# Patient Record
Sex: FEMALE | Race: WHITE | URBAN - METROPOLITAN AREA
[De-identification: names, ages, dates, MRNs, and addresses within clinical notes are randomized per-mention and may not be internally consistent; named-entity substitution may affect disease eponyms.]

---

## 2017-01-01 ENCOUNTER — MYC MEDICAL ADVICE (OUTPATIENT)
Dept: INTERNAL MEDICINE | Facility: CLINIC | Age: 72
End: 2017-01-01

## 2017-01-01 ENCOUNTER — TRANSFERRED RECORDS (OUTPATIENT)
Dept: HEALTH INFORMATION MANAGEMENT | Facility: CLINIC | Age: 72
End: 2017-01-01

## 2017-01-01 ENCOUNTER — OFFICE VISIT (OUTPATIENT)
Dept: INTERNAL MEDICINE | Facility: CLINIC | Age: 72
End: 2017-01-01
Payer: COMMERCIAL

## 2017-01-01 ENCOUNTER — HEALTH MAINTENANCE LETTER (OUTPATIENT)
Age: 72
End: 2017-01-01

## 2017-01-01 ENCOUNTER — TELEPHONE (OUTPATIENT)
Dept: INTERNAL MEDICINE | Facility: CLINIC | Age: 72
End: 2017-01-01

## 2017-01-01 VITALS
WEIGHT: 246 LBS | BODY MASS INDEX: 37.4 KG/M2 | HEART RATE: 86 BPM | SYSTOLIC BLOOD PRESSURE: 140 MMHG | DIASTOLIC BLOOD PRESSURE: 75 MMHG | OXYGEN SATURATION: 93 %

## 2017-01-01 VITALS
BODY MASS INDEX: 39.22 KG/M2 | WEIGHT: 258.8 LBS | DIASTOLIC BLOOD PRESSURE: 79 MMHG | WEIGHT: 244 LBS | HEIGHT: 68 IN | SYSTOLIC BLOOD PRESSURE: 148 MMHG | HEART RATE: 100 BPM | HEIGHT: 68 IN | DIASTOLIC BLOOD PRESSURE: 87 MMHG | TEMPERATURE: 98 F | TEMPERATURE: 97.6 F | OXYGEN SATURATION: 93 % | OXYGEN SATURATION: 95 % | SYSTOLIC BLOOD PRESSURE: 137 MMHG | BODY MASS INDEX: 36.98 KG/M2 | HEART RATE: 74 BPM | RESPIRATION RATE: 20 BRPM

## 2017-01-01 DIAGNOSIS — L30.9 ECZEMA, UNSPECIFIED TYPE: ICD-10-CM

## 2017-01-01 DIAGNOSIS — I10 BENIGN ESSENTIAL HYPERTENSION: ICD-10-CM

## 2017-01-01 DIAGNOSIS — I63.9 CEREBROVASCULAR ACCIDENT (CVA), UNSPECIFIED MECHANISM (H): Primary | ICD-10-CM

## 2017-01-01 DIAGNOSIS — E78.5 HYPERLIPIDEMIA WITH TARGET LDL LESS THAN 130: Primary | ICD-10-CM

## 2017-01-01 DIAGNOSIS — Z12.11 SCREEN FOR COLON CANCER: ICD-10-CM

## 2017-01-01 DIAGNOSIS — I10 BENIGN ESSENTIAL HYPERTENSION: Primary | ICD-10-CM

## 2017-01-01 DIAGNOSIS — R73.09 OTHER ABNORMAL GLUCOSE: ICD-10-CM

## 2017-01-01 LAB
ANION GAP SERPL CALCULATED.3IONS-SCNC: 7 MMOL/L (ref 3–14)
ANION GAP SERPL CALCULATED.3IONS-SCNC: 9 MMOL/L (ref 3–14)
BUN SERPL-MCNC: 11 MG/DL (ref 7–30)
BUN SERPL-MCNC: 9 MG/DL (ref 7–30)
CALCIUM SERPL-MCNC: 9.3 MG/DL (ref 8.5–10.1)
CALCIUM SERPL-MCNC: 9.7 MG/DL (ref 8.5–10.1)
CHLORIDE SERPL-SCNC: 103 MMOL/L (ref 94–109)
CHLORIDE SERPL-SCNC: 104 MMOL/L (ref 94–109)
CHOLEST SERPL-MCNC: 152 MG/DL
CO2 SERPL-SCNC: 27 MMOL/L (ref 20–32)
CO2 SERPL-SCNC: 28 MMOL/L (ref 20–32)
COPATH REPORT: NORMAL
CREAT SERPL-MCNC: 0.8 MG/DL (ref 0.52–1.04)
CREAT SERPL-MCNC: 0.83 MG/DL (ref 0.52–1.04)
FINAL DIAGNOSIS: NORMAL
GFR SERPL CREATININE-BSD FRML MDRD: 68 ML/MIN/1.7M2
GFR SERPL CREATININE-BSD FRML MDRD: 70 ML/MIN/1.7M2
GLUCOSE SERPL-MCNC: 106 MG/DL (ref 70–99)
GLUCOSE SERPL-MCNC: 112 MG/DL (ref 70–99)
HBA1C MFR BLD: 6 % (ref 4.3–6)
HDLC SERPL-MCNC: 47 MG/DL
HPV HR 12 DNA CVX QL NAA+PROBE: NEGATIVE
HPV16 DNA SPEC QL NAA+PROBE: NEGATIVE
HPV18 DNA SPEC QL NAA+PROBE: NEGATIVE
LDLC SERPL CALC-MCNC: 67 MG/DL
NONHDLC SERPL-MCNC: 105 MG/DL
PAP: NORMAL
POTASSIUM SERPL-SCNC: 4.1 MMOL/L (ref 3.4–5.3)
POTASSIUM SERPL-SCNC: 4.3 MMOL/L (ref 3.4–5.3)
SODIUM SERPL-SCNC: 139 MMOL/L (ref 133–144)
SODIUM SERPL-SCNC: 139 MMOL/L (ref 133–144)
SPECIMEN DESCRIPTION: NORMAL
TRIGL SERPL-MCNC: 191 MG/DL

## 2017-01-01 PROCEDURE — 99213 OFFICE O/P EST LOW 20 MIN: CPT | Performed by: INTERNAL MEDICINE

## 2017-01-01 PROCEDURE — 80048 BASIC METABOLIC PNL TOTAL CA: CPT | Performed by: INTERNAL MEDICINE

## 2017-01-01 PROCEDURE — 36415 COLL VENOUS BLD VENIPUNCTURE: CPT | Performed by: INTERNAL MEDICINE

## 2017-01-01 PROCEDURE — 83036 HEMOGLOBIN GLYCOSYLATED A1C: CPT | Performed by: INTERNAL MEDICINE

## 2017-01-01 PROCEDURE — 99214 OFFICE O/P EST MOD 30 MIN: CPT | Performed by: INTERNAL MEDICINE

## 2017-01-01 PROCEDURE — 80061 LIPID PANEL: CPT | Performed by: INTERNAL MEDICINE

## 2017-01-01 PROCEDURE — G0476 HPV COMBO ASSAY CA SCREEN: HCPCS | Performed by: INTERNAL MEDICINE

## 2017-01-01 PROCEDURE — G0145 SCR C/V CYTO,THINLAYER,RESCR: HCPCS | Performed by: INTERNAL MEDICINE

## 2017-01-01 RX ORDER — TRIAMCINOLONE ACETONIDE 1 MG/G
CREAM TOPICAL 2 TIMES DAILY
Qty: 80 G | Refills: 0 | Status: SHIPPED | OUTPATIENT
Start: 2017-01-01

## 2017-01-01 RX ORDER — LISINOPRIL 20 MG/1
20 TABLET ORAL DAILY
Qty: 90 TABLET | Refills: 1 | Status: SHIPPED | OUTPATIENT
Start: 2017-01-01 | End: 2017-01-01

## 2017-01-01 RX ORDER — ATORVASTATIN CALCIUM 40 MG/1
TABLET, FILM COATED ORAL
Qty: 90 TABLET | Refills: 2 | Status: SHIPPED | OUTPATIENT
Start: 2017-01-01

## 2017-01-01 RX ORDER — LISINOPRIL 10 MG/1
10 TABLET ORAL DAILY
Qty: 90 TABLET | Refills: 1 | Status: SHIPPED | OUTPATIENT
Start: 2017-01-01

## 2017-01-01 RX ORDER — LISINOPRIL 20 MG/1
TABLET ORAL
Qty: 90 TABLET | Refills: 1 | OUTPATIENT
Start: 2017-01-01

## 2017-01-20 NOTE — PROGRESS NOTES
SUBJECTIVE:                                                    Sandy Oreilly is a 71 year old female who presents to clinic today for the following health issues:    PMH of CVA, parotid gland cancer s/p surgery, and XRT, and a resultant chronic left facial droop, hypertension, JESSE on CPAP, HLD,  S/p left carotid stenting in 4/2016,     Hypertension Follow-up      Outpatient blood pressures are being checked at home.  Results are <140/90. Maybe 140 once a week    Low Salt Diet: low salt       Amount of exercise or physical activity: 6-7 days/week for an average of 15-30 minutes    Problems taking medications regularly: No    Medication side effects: none    Diet: low salt    Patient denies chest pain, chest pressure, shortness of breath, palpitations, headaches, visual changes, or any noted lower extremity swelling.     HLD:  History of CVA per patient d/t XRT to the neck region.  On Atorvastatin 40mg daily and now on aspirin 81mg daily.         Problem list and histories reviewed & adjusted, as indicated.  Additional history: as documented    Patient Active Problem List   Diagnosis     CARDIOVASCULAR SCREENING; LDL GOAL LESS THAN 160     Health Care Home     Advanced directives, counseling/discussion     Elevated blood pressure     Hyperlipidemia with target LDL less than 130     Benign neoplasm of colon (ADENOMATOUS)     Eczema     Cerebrovascular accident (CVA), unspecified     JESSE (obstructive sleep apnea)     Prediabetes     Benign essential hypertension     Past Surgical History   Procedure Laterality Date     Cholecystectomy  2009     Parotidectomy  1996 & 1997     thyroid CA     Tonsillectomy         Social History   Substance Use Topics     Smoking status: Never Smoker      Smokeless tobacco: Never Used     Alcohol Use: Yes     Family History   Problem Relation Age of Onset     HEART DISEASE Brother      CABAG     HEART DISEASE Brother      CABAG     CANCER Brother      Esophegeal CA     HEART DISEASE  "Brother          Current Outpatient Prescriptions   Medication Sig Dispense Refill     Multiple Vitamins-Minerals (ONE-A-DAY WOMENS 50 PLUS PO) Take by mouth daily       lisinopril (PRINIVIL/ZESTRIL) 20 MG tablet Take 1 tablet (20 mg) by mouth daily 90 tablet 1     atorvastatin (LIPITOR) 40 MG tablet Take 1 tablet (40 mg) by mouth daily 90 tablet 3     aspirin 81 MG tablet Take 81 mg by mouth daily       senna-docusate (SENOKOT-S;PERICOLACE) 8.6-50 MG per tablet Take 1 tablet by mouth daily       triamcinolone (KENALOG) 0.1 % cream Apply topically 2 times daily       ==============================================================  ROS:  Constitutional, HEENT, cardiovascular, pulmonary, GI, , musculoskeletal, neuro, skin, endocrine and psych systems are negative, except as otherwise noted.       OBJECTIVE:                                                    /87 mmHg  Pulse 100  Temp(Src) 97.6  F (36.4  C) (Oral)  Resp 20  Ht 5' 8\" (1.727 m)  Wt 258 lb 12.8 oz (117.391 kg)  BMI 39.36 kg/m2  SpO2 93%  Body mass index is 39.36 kg/(m^2).     GENERAL APPEARANCE: healthy, alert and in no distress  EYES: Eyes grossly normal to inspection, and conjunctivae and sclerae normal  HENT: head normocephalic and atraumatic and mouth without ulcers or lesions, oropharynx clear and oral mucous membranes moist  NECK: no noticeable adenopathy, no asymmetry, masses, or scars   RESP: lungs clear to auscultation - no rales, rhonchi or wheezes  CV: regular rate and rhythm, normal S1 S2, no S3 or S4, no murmur, click or rub, no peripheral edema and peripheral pulses strong  ABDOMEN: soft, nontender, no hepatosplenomegaly, no masses and bowel sounds normal  MS: no musculoskeletal defects are noted and gait is age appropriate without ataxia  SKIN: no suspicious lesions or rashes  NEURO: mentation intact and speech normal  PSYCH: mentation appears normal and affect normal/bright.    Results for orders placed or performed in visit " on 01/20/17   Basic metabolic panel   Result Value Ref Range    Sodium 139 133 - 144 mmol/L    Potassium 4.3 3.4 - 5.3 mmol/L    Chloride 103 94 - 109 mmol/L    Carbon Dioxide 27 20 - 32 mmol/L    Anion Gap 9 3 - 14 mmol/L    Glucose 112 (H) 70 - 99 mg/dL    Urea Nitrogen 11 7 - 30 mg/dL    Creatinine 0.80 0.52 - 1.04 mg/dL    GFR Estimate 70 >60 mL/min/1.7m2    GFR Estimate If Black 85 >60 mL/min/1.7m2    Calcium 9.3 8.5 - 10.1 mg/dL   Lipid Profile with reflex to direct LDL   Result Value Ref Range    Cholesterol 152 <200 mg/dL    Triglycerides 191 (H) <150 mg/dL    HDL Cholesterol 47 (L) >49 mg/dL    LDL Cholesterol Calculated 67 <100 mg/dL    Non HDL Cholesterol 105 <130 mg/dL   Hemoglobin A1c   Result Value Ref Range    Hemoglobin A1C 6.0 4.3 - 6.0 %      The ASCVD Risk score (Coleen SEPULVEDA Jr., et al., 2013) failed to calculate for the following reasons:    The patient has a prior MCI or stroke diagnosis      ASSESSMENT/PLAN:                                                        ICD-10-CM    1. Cerebrovascular accident (CVA), unspecified mechanism (H) I63.9 Lipid Profile with reflex to direct LDL   2. Benign essential hypertension I10 Basic metabolic panel     lisinopril (PRINIVIL/ZESTRIL) 20 MG tablet   3. Other abnormal glucose R73.09 Hemoglobin A1c     (I63.9) Cerebrovascular accident (CVA), unspecified mechanism (H)  (primary encounter diagnosis)    LDL CHOLESTEROL CALCULATED   Date Value Ref Range Status   01/20/2017 67 <100 mg/dL Final     Comment:     Desirable:       <100 mg/dl   ]    Comment: LDL is low currently  Plan:   Continue current medications, including the current statin  Lipid Profile with reflex to direct LDL            (I10) Benign essential hypertension  Comment: controlled asympt  Plan: Basic metabolic panel, lisinopril         (PRINIVIL/ZESTRIL) 20 MG tablet  Continue current regimen.     (R73.09) Other abnormal glucose  Comment: prediabetes range;  Plan: Hemoglobin A1c        As per orders  above and patient instructions below.  May consider metformin in the future     Patient Instructions   We will let you know your test results as discussed: by phone for urgent results, otherwise by postal mail.  Please return to clinic in 6 months (we may advise a visit sooner, based on today's lab/test results).                 Zayra Marques MD  [facility]

## 2017-01-20 NOTE — PATIENT INSTRUCTIONS
We will let you know your test results as discussed: by phone for urgent results, otherwise by postal mail.  Please return to clinic in 6 months (we may advise a visit sooner, based on today's lab/test results).

## 2017-01-20 NOTE — Clinical Note
"[facility]  [address] [facility] [address] [phone]        January 26, 2017    Sandy Oreilly  [address] [facility] [address]            Dear Sandy,     Your kidney function and blood electrolytes are within normal limits.   Your cholesterol numbers have improved significantly.  Please continue your current Lipitor (Atorvastatin).  Your hemoglobin A1C, which reflects your average blood sugars over the past 3 months, is within its previous \"Prediabetes\" range.    Otherwise, please continue the treatment plan that we discussed at your last clinic visit and let me know if you have any questions via [facility] or by calling [phone].      Zayra Marques MD/ms    Results for orders placed or performed in visit on 01/20/17   Basic metabolic panel   Result Value Ref Range    Sodium 139 133 - 144 mmol/L    Potassium 4.3 3.4 - 5.3 mmol/L    Chloride 103 94 - 109 mmol/L    Carbon Dioxide 27 20 - 32 mmol/L    Anion Gap 9 3 - 14 mmol/L    Glucose 112 (H) 70 - 99 mg/dL    Urea Nitrogen 11 7 - 30 mg/dL    Creatinine 0.80 0.52 - 1.04 mg/dL    GFR Estimate 70 >60 mL/min/1.7m2    GFR Estimate If Black 85 >60 mL/min/1.7m2    Calcium 9.3 8.5 - 10.1 mg/dL   Lipid Profile with reflex to direct LDL   Result Value Ref Range    Cholesterol 152 <200 mg/dL    Triglycerides 191 (H) <150 mg/dL    HDL Cholesterol 47 (L) >49 mg/dL    LDL Cholesterol Calculated 67 <100 mg/dL    Non HDL Cholesterol 105 <130 mg/dL   Hemoglobin A1c   Result Value Ref Range    Hemoglobin A1C 6.0 4.3 - 6.0 %             "

## 2017-01-20 NOTE — NURSING NOTE
"Chief Complaint   Patient presents with     Hypertension       Initial /87 mmHg  Pulse 100  Temp(Src) 97.6  F (36.4  C) (Oral)  Resp 20  Ht 5' 8\" (1.727 m)  Wt 258 lb 12.8 oz (117.391 kg)  BMI 39.36 kg/m2  SpO2 93% Estimated body mass index is 39.36 kg/(m^2) as calculated from the following:    Height as of this encounter: 5' 8\" (1.727 m).    Weight as of this encounter: 258 lb 12.8 oz (117.391 kg).  BP completed using cuff size: sydnie Faria CMA    "

## 2017-01-20 NOTE — MR AVS SNAPSHOT
"              After Visit Summary   1/20/2017    Sandy Oreilly    MRN: [id number]           Patient Information     Date Of Birth          1945        Visit Information        Provider Department      1/20/2017 7:30 AM Zayra Marques MD [facility]        Today's Diagnoses     Cerebrovascular accident (CVA), unspecified mechanism (H)    -  1     Benign essential hypertension         Other abnormal glucose           Care Instructions    We will let you know your test results as discussed: by phone for urgent results, otherwise by postal mail.  Please return to clinic in 6 months (we may advise a visit sooner, based on today's lab/test results).        Follow-ups after your visit        Who to contact     If you have questions or need follow up information about today's clinic visit or your schedule please contact [facility] directly at [phone].  Normal or non-critical lab and imaging results will be communicated to you by MyChart, letter or phone within 4 business days after the clinic has received the results. If you do not hear from us within 7 days, please contact the clinic through MyChart or phone. If you have a critical or abnormal lab result, we will notify you by phone as soon as possible.  Submit refill requests through [email or web address] or call your pharmacy and they will forward the refill request to us. Please allow 3 business days for your refill to be completed.          Additional Information About Your Visit        MyChart Information     [email or web address] lets you send messages to your doctor, view your test results, renew your prescriptions, schedule appointments and more. To sign up, go to [email or web address] . Click on \"Log in\" on the left side of the screen, which will take you to the Welcome page. Then click on \"Sign up Now\" on the right side of the page.     You will be asked to enter the access code listed below, as well as some personal information. " "Please follow the directions to create your username and password.     Your access code is: [email or web address]  Expires: 2017  8:09 AM     Your access code will  in 90 days. If you need help or a new code, please call your [place] [facility] clinic or [phone].        Care EveryWhere ID     This is your Care EveryWhere ID. This could be used by other organizations to access your [facility] medical records  [id number]        Your Vitals Were     Pulse Temperature Respirations Height BMI (Body Mass Index) Pulse Oximetry    100 97.6  F (36.4  C) (Oral) 20 5' 8\" (1.727 m) 39.36 kg/m2 93%       Blood Pressure from Last 3 Encounters:   17 148/87   16 124/70   16 105/53    Weight from Last 3 Encounters:   17 258 lb 12.8 oz (117.391 kg)   16 259 lb (117.482 kg)   04/15/16 263 lb (119.296 kg)              We Performed the Following     Basic metabolic panel     Hemoglobin A1c     Lipid Profile with reflex to direct LDL          Where to get your medicines      These medications were sent to [place] [address] [place], [place] -  [facility]   [facility] [address]     Phone:  [phone]    - lisinopril 20 MG tablet       Primary Care Provider Office Phone # Fax #    Zayra Marques [phone]       [facility] [address] [facility] [address]        Thank you!     Thank you for choosing [facility]  for your care. Our goal is always to provide you with excellent care. Hearing back from our patients is one way we can continue to improve our services. Please take a few minutes to complete the written survey that you may receive in the mail after your visit with us. Thank you!             Your Updated Medication List - Protect others around you: Learn how to safely use, store and throw away your medicines at www.disposemymeds.org.          This list is accurate as of: 17  8:09 AM.  Always use your most " recent med list.                   Brand Name Dispense Instructions for use    aspirin 81 MG tablet      Take 81 mg by mouth daily       atorvastatin 40 MG tablet    LIPITOR    90 tablet    Take 1 tablet (40 mg) by mouth daily       lisinopril 20 MG tablet    PRINIVIL/ZESTRIL    90 tablet    Take 1 tablet (20 mg) by mouth daily       ONE-A-DAY WOMENS 50 PLUS PO      Take by mouth daily       senna-docusate 8.6-50 MG per tablet    SENOKOT-S;PERICOLACE     Take 1 tablet by mouth daily       triamcinolone 0.1 % cream    KENALOG     Apply topically 2 times daily

## 2017-01-26 NOTE — PROGRESS NOTES
"Quick Note:    Please send letter informing the patient and attach results:    Dear Sandy,     Your kidney function and blood electrolytes are within normal limits.   Your cholesterol numbers have improved significantly. Please continue your current Lipitor (Atorvastatin).  Your hemoglobin A1C, which reflects your average blood sugars over the past 3 months, is within its previous \"Prediabetes\" range.    Otherwise, please continue the treatment plan that we discussed at your last clinic visit and let me know if you have any questions via [employer] or by calling [phone].      Zayra Marques MD  ______  "

## 2017-07-20 NOTE — PROGRESS NOTES
"  SUBJECTIVE:                                                    Sandy Oreilly is a 71 year old female who presents to clinic today for the following health issues:    PMH of CVA, parotid gland cancer s/p surgery, and XRT, and a resultant chronic left facial droop, hypertension, JESSE on CPAP, HLD,  S/p left carotid stenting in 4/2016, hypertension,    Hypertension Follow-up      Outpatient blood pressures are being checked at home.  Results are 124-133/62-70.    Low Salt Diet: low salt        Amount of exercise or physical activity: None    Problems taking medications regularly: No    Medication side effects: none  Diet: low salt, low fat/cholesterol and Nutri-System    Recent vascular surgery note reviewed.   Patient is on lisinopril.   Patient denies chest pain, chest pressure, shortness of breath, palpitations, headaches, visual changes, or any noted lower extremity swelling.     Cyst:  On the patient's neck, right posterior aspect-patient was seen by an outside ENT (ie, [facility] and was advised that it needs to be excised).  Patient is working with them to get this procedure scheduled.         Problem list and histories reviewed & adjusted, as indicated.  Additional history: as documented    Patient Active Problem List   Diagnosis     CARDIOVASCULAR SCREENING; LDL GOAL LESS THAN 160     Health Care Home     Advanced directives, counseling/discussion     Elevated blood pressure     Hyperlipidemia with target LDL less than 130     Benign neoplasm of colon (ADENOMATOUS)     Eczema     Cerebrovascular accident (CVA), unspecified     JESSE (obstructive sleep apnea)     Prediabetes     Benign essential hypertension     HTN, goal below 140/90     Past Surgical History:   Procedure Laterality Date     CHOLECYSTECTOMY  2009     PAROTIDECTOMY  1996 & 1997    thyroid CA     TONSILLECTOMY         Social History   Substance Use Topics     Smoking status: Never Smoker     Smokeless tobacco: Never Used     Alcohol " "use Yes     Family History   Problem Relation Age of Onset     HEART DISEASE Brother      DARCYAG     HEART DISEASE Brother      BLACK     CANCER Brother      Esophegeal CA     HEART DISEASE Brother          Current Outpatient Prescriptions   Medication Sig Dispense Refill     triamcinolone (KENALOG) 0.1 % cream Apply topically 2 times daily 80 g 0     lisinopril (PRINIVIL/ZESTRIL) 20 MG tablet Take 1 tablet (20 mg) by mouth daily 90 tablet 1     atorvastatin (LIPITOR) 40 MG tablet TAKE ONE TABLET BY MOUTH ONE TIME DAILY 90 tablet 2     Multiple Vitamins-Minerals (ONE-A-DAY WOMENS 50 PLUS PO) Take by mouth daily       aspirin 81 MG tablet Take 81 mg by mouth daily       senna-docusate (SENOKOT-S;PERICOLACE) 8.6-50 MG per tablet Take 1 tablet by mouth daily           Reviewed and updated as needed this visit by clinical staff     Reviewed and updated as needed this visit by Provider           ==============================================================  ROS:  Constitutional, HEENT, cardiovascular, pulmonary, GI, , musculoskeletal, neuro, skin, endocrine and psych systems are negative, except as otherwise noted.         OBJECTIVE:                                                    /79  Pulse 74  Temp 98  F (36.7  C) (Oral)  Ht 5' 8\" (1.727 m)  Wt 244 lb (110.7 kg)  SpO2 95%  BMI 37.1 kg/m2  Body mass index is 37.1 kg/(m^2).     GENERAL APPEARANCE: healthy, alert and in no distress  EYES: Eyes grossly normal to inspection, and conjunctivae and sclerae normal  HENT:   Chronic left facial droop.  O/w, head normocephalic and atraumatic and mouth without ulcers or lesions, oropharynx clear and oral mucous membranes moist  NECK:   There is a 1.5cm mobile, non-tender, flesh-colored nodule on the lateral-posterior aspect of the right side of the neck.  Post-surgical changes of the left lower face and left side of the neck.   O/w, no noticeable adenopathy, no asymmetry, masses, or scars   RESP: lungs clear to " auscultation - no rales, rhonchi or wheezes  CV: regular rate and rhythm, normal S1 S2, no S3 or S4, no murmur, click or rub, no peripheral edema and peripheral pulses strong  ABDOMEN: soft, nontender, no hepatosplenomegaly, no masses and bowel sounds normal  MS: no musculoskeletal defects are noted and gait is age appropriate without ataxia  SKIN: no suspicious lesions or rashes  NEURO:   Chronic left facial droop.  mentation intact and speech normal  PSYCH: mentation appears normal and affect normal/bright.     Results for orders placed or performed in visit on 07/21/17   Basic metabolic panel   Result Value Ref Range    Sodium 139 133 - 144 mmol/L    Potassium 4.1 3.4 - 5.3 mmol/L    Chloride 104 94 - 109 mmol/L    Carbon Dioxide 28 20 - 32 mmol/L    Anion Gap 7 3 - 14 mmol/L    Glucose 106 (H) 70 - 99 mg/dL    Urea Nitrogen 9 7 - 30 mg/dL    Creatinine 0.83 0.52 - 1.04 mg/dL    GFR Estimate 68 >60 mL/min/1.7m2    GFR Estimate If Black 82 >60 mL/min/1.7m2    Calcium 9.7 8.5 - 10.1 mg/dL        ASSESSMENT/PLAN:                                                        ICD-10-CM    1. Benign essential hypertension I10 lisinopril (PRINIVIL/ZESTRIL) 20 MG tablet     Basic metabolic panel   2. Eczema, unspecified type L30.9 triamcinolone (KENALOG) 0.1 % cream    on right shoulder and labia, recurrent    3. Screen for colon cancer Z12.11 GASTROENTEROLOGY ADULT REF PROCEDURE ONLY       (I10) Benign essential hypertension  (primary encounter diagnosis)  Comment: at goal, asymptomatic; BMP stable.  Plan: lisinopril (PRINIVIL/ZESTRIL) 20 MG tablet,         Basic metabolic panel        As per orders above and patient instructions below.     (L30.9) Eczema, unspecified type  Comment: on right shoulder and labia, recurrent   Plan: refill triamcinolone (KENALOG) 0.1 % cream            (Z12.11) Screen for colon cancer  Comment:   Plan: GASTROENTEROLOGY ADULT REF PROCEDURE ONLY       As per orders above and patient instructions  below.        Patient Instructions   You are due for your colonoscopy now. Please call [facility] [phone]-to schedule this appointment.    See your eye doctor once a year.      Please return to clinic in 6 months (we may advise a visit sooner, based on today's lab/test results).                   Zayra Marques MD  [facility]

## 2017-07-21 PROBLEM — I10 HTN, GOAL BELOW 140/90: Status: ACTIVE | Noted: 2017-01-01

## 2017-07-21 NOTE — LETTER
[facility]  [address] [facility] [address] [phone]        July 24, 2017    Sandy Oreilly  [address] [facility] [address]            Dear Sandy,    Your kidney function and blood electrolytes are within normal limits.   Please continue the treatment plan that we discussed at your last clinic visit and let me know if you have any questions via [employer] or by calling [phone].      Zayra Marques MD/santo      Results for orders placed or performed in visit on 07/21/17   Basic metabolic panel   Result Value Ref Range    Sodium 139 133 - 144 mmol/L    Potassium 4.1 3.4 - 5.3 mmol/L    Chloride 104 94 - 109 mmol/L    Carbon Dioxide 28 20 - 32 mmol/L    Anion Gap 7 3 - 14 mmol/L    Glucose 106 (H) 70 - 99 mg/dL    Urea Nitrogen 9 7 - 30 mg/dL    Creatinine 0.83 0.52 - 1.04 mg/dL    GFR Estimate 68 >60 mL/min/1.7m2    GFR Estimate If Black 82 >60 mL/min/1.7m2    Calcium 9.7 8.5 - 10.1 mg/dL

## 2017-07-21 NOTE — MR AVS SNAPSHOT
After Visit Summary   7/21/2017    Sandy Oreilly    MRN: [id number]           Patient Information     Date Of Birth          1945        Visit Information        Provider Department      7/21/2017 7:30 AM Zayra Marques MD [facility]        Today's Diagnoses     Screen for colon cancer    -  1    Benign essential hypertension        Eczema, unspecified type          Care Instructions    You are due for your colonoscopy now. Please call [facility] [phone]-to schedule this appointment.    See your eye doctor once a year.      Please return to clinic in 6 months (we may advise a visit sooner, based on today's lab/test results).          Follow-ups after your visit        Additional Services     GASTROENTEROLOGY ADULT REF PROCEDURE ONLY       Last Lab Result: Creatinine (mg/dL)       Date                     Value                 01/20/2017               0.80             ----------  Body mass index is 37.1 kg/(m^2).     Needed:  No  Language:  English    Patient will be contacted to schedule procedure.     Please be aware that coverage of these services is subject to the terms and limitations of your health insurance plan.  Call member services at your health plan with any benefit or coverage questions.  Any procedures must be performed at a [facility] facility OR coordinated by your clinic's referral office.    Please bring the following with you to your appointment:    (1) Any X-Rays, CTs or MRIs which have been performed.  Contact the facility where they were done to arrange for  prior to your scheduled appointment.    (2) List of current medications   (3) This referral request   (4) Any documents/labs given to you for this referral                  Your next 10 appointments already scheduled     Jan 24, 2018  8:00 AM CST   Office Visit with Zayra Marques MD   [facility]     "[address] [facility] [address] [phone]           Bring a current list of meds and any records pertaining to this visit.  For Physicals, please bring immunization records and any forms needing to be filled out.  Please arrive 10 minutes early to complete paperwork.              Who to contact     If you have questions or need follow up information about today's clinic visit or your schedule please contact [facility] directly at [phone].  Normal or non-critical lab and imaging results will be communicated to you by MyChart, letter or phone within 4 business days after the clinic has received the results. If you do not hear from us within 7 days, please contact the clinic through Protection Plushart or phone. If you have a critical or abnormal lab result, we will notify you by phone as soon as possible.  Submit refill requests through GardenStory or call your pharmacy and they will forward the refill request to us. Please allow 3 business days for your refill to be completed.          Additional Information About Your Visit        [facility] Information     GardenStory lets you send messages to your doctor, view your test results, renew your prescriptions, schedule appointments and more. To sign up, go to [email or web address] . Click on \"Log in\" on the left side of the screen, which will take you to the Welcome page. Then click on \"Sign up Now\" on the right side of the page.     You will be asked to enter the access code listed below, as well as some personal information. Please follow the directions to create your username and password.     Your access code is: [email or web address]  Expires: 10/19/2017  8:33 AM     Your access code will  in 90 days. If you need help or a new code, please call your [facility] or [phone].        Care EveryWhere ID     This is your Care EveryWhere ID. This could be used by other organizations to access your [facility] medical records  [id number]        Your " "Vitals Were     Pulse Temperature Height Pulse Oximetry BMI (Body Mass Index)       74 98  F (36.7  C) (Oral) 5' 8\" (1.727 m) 95% 37.1 kg/m2        Blood Pressure from Last 3 Encounters:   07/21/17 137/79   01/20/17 148/87   07/20/16 124/70    Weight from Last 3 Encounters:   07/21/17 244 lb (110.7 kg)   01/20/17 258 lb 12.8 oz (117.4 kg)   07/20/16 259 lb (117.5 kg)              We Performed the Following     Basic metabolic panel     GASTROENTEROLOGY ADULT REF PROCEDURE ONLY          Where to get your medicines      These medications were sent to [place] [facility] [address] [place], [place] - [address] [facility]  [address] [facility] [address]     Phone:  [phone]     lisinopril 20 MG tablet    triamcinolone 0.1 % cream          Primary Care Provider Office Phone # Fax #    Zayra Marques [phone]       [facility] [address] [facility] [address]        Equal Access to Services     [employer] : Hadii aad ku hadasho Soomaali, waaxda luqadaha, qaybta kaalmada adeegyada, heena haynes hayralph randall . So [facility] [phone].    ATENCIÓN: Si habla español, tiene a kevin disposición servicios gratuitos de asistencia lingüística. [facility] [phone].    We comply with applicable federal civil rights laws and [place] laws. We do not discriminate on the basis of race, color, national origin, age, disability sex, sexual orientation or gender identity.            Thank you!     Thank you for choosing [facility]  for your care. Our goal is always to provide you with excellent care. Hearing back from our patients is one way we can continue to improve our services. Please take a few minutes to complete the written survey that you may receive in the mail after your visit with us. Thank you!             Your Updated Medication List - Protect others around you: Learn how to safely use, store and throw away your medicines at " www.disposemymeds.org.          This list is accurate as of: 7/21/17  8:33 AM.  Always use your most recent med list.                   Brand Name Dispense Instructions for use Diagnosis    aspirin 81 MG tablet      Take 81 mg by mouth daily        atorvastatin 40 MG tablet    LIPITOR    90 tablet    TAKE ONE TABLET BY MOUTH ONE TIME DAILY    Hyperlipidemia with target LDL less than 130       lisinopril 20 MG tablet    PRINIVIL/ZESTRIL    90 tablet    Take 1 tablet (20 mg) by mouth daily    Benign essential hypertension       ONE-A-DAY WOMENS 50 PLUS PO      Take by mouth daily        senna-docusate 8.6-50 MG per tablet    SENOKOT-S;PERICOLACE     Take 1 tablet by mouth daily        triamcinolone 0.1 % cream    KENALOG    80 g    Apply topically 2 times daily    Eczema, unspecified type

## 2017-07-21 NOTE — PATIENT INSTRUCTIONS
You are due for your colonoscopy now. Please call [facility] [phone]-to schedule this appointment.    See your eye doctor once a year.      Please return to clinic in 6 months (we may advise a visit sooner, based on today's lab/test results).

## 2017-07-21 NOTE — NURSING NOTE
"Chief Complaint   Patient presents with     Hypertension       Initial /79  Pulse 74  Temp 98  F (36.7  C) (Oral)  Ht 5' 8\" (1.727 m)  Wt 244 lb (110.7 kg)  SpO2 95%  BMI 37.1 kg/m2 Estimated body mass index is 37.1 kg/(m^2) as calculated from the following:    Height as of this encounter: 5' 8\" (1.727 m).    Weight as of this encounter: 244 lb (110.7 kg).  Medication Reconciliation: complete    Leah Leon MA    "

## 2017-07-24 NOTE — PROGRESS NOTES
Please send letter informing the patient and attach results:    Dear Sandy,    Your kidney function and blood electrolytes are within normal limits.   Please continue the treatment plan that we discussed at your last clinic visit and let me know if you have any questions via POPSUGAR or by calling [phone].      Zayra Marques MD

## 2017-09-13 NOTE — LETTER
September 28, 2017      Sandy Guillenlas  [address] [facility] [address]    Dear ,      I am happy to inform you that your cervical cancer screening test (PAP smear) was normal and your Human Papillomavirus (HPV) test was negative.    Per current guidelines, you no longer need to have pap smears completed.     Please continue to be seen every year for an annual wellness visit and other preventative tests.     Please contact my office at [phone] if you have further questions.    Sincerely,      Zayra Marques MD/darwin

## 2017-09-13 NOTE — PROGRESS NOTES
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  No recent vaginal symptoms, per patient.    Problem list and histories reviewed & adjusted, as indicated.  Additional history: as documented    Patient Active Problem List   Diagnosis     CARDIOVASCULAR SCREENING; LDL GOAL LESS THAN 160     Health Care Home     Advanced directives, counseling/discussion     Elevated blood pressure     Hyperlipidemia with target LDL less than 130     Benign neoplasm of colon (ADENOMATOUS)     Eczema     Cerebrovascular accident (CVA), unspecified     JESSE (obstructive sleep apnea)     Prediabetes     Benign essential hypertension     HTN, goal below 140/90     Past Surgical History:   Procedure Laterality Date     CHOLECYSTECTOMY  2009     PAROTIDECTOMY  1996 & 1997    thyroid CA     TONSILLECTOMY         Social History   Substance Use Topics     Smoking status: Never Smoker     Smokeless tobacco: Never Used     Alcohol use Yes     Family History   Problem Relation Age of Onset     HEART DISEASE Brother      CABAG     HEART DISEASE Brother      CABAG     CANCER Brother      Esophegeal CA     HEART DISEASE Brother          Current Outpatient Prescriptions   Medication Sig Dispense Refill     triamcinolone (KENALOG) 0.1 % cream Apply topically 2 times daily 80 g 0     lisinopril (PRINIVIL/ZESTRIL) 20 MG tablet Take 1 tablet (20 mg) by mouth daily 90 tablet 1     atorvastatin (LIPITOR) 40 MG tablet TAKE ONE TABLET BY MOUTH ONE TIME DAILY 90 tablet 2     Multiple Vitamins-Minerals (ONE-A-DAY WOMENS 50 PLUS PO) Take by mouth daily       aspirin 81 MG tablet Take 81 mg by mouth daily        senna-docusate (SENOKOT-S;PERICOLACE) 8.6-50 MG per tablet Take 1 tablet by mouth daily           Reviewed and updated as needed this visit by clinical staffTobacco  Allergies  Med Hx  Surg Hx  Fam Hx  Soc Hx      Reviewed and updated as needed this visit by Provider         ==============================================================  ROS:  Constitutional, HEENT, cardiovascular, pulmonary, GI, , musculoskeletal, neuro, skin, endocrine and psych systems are negative, except as otherwise noted.         OBJECTIVE:                                                    /75  Pulse 86  Wt 246 lb (111.6 kg)  SpO2 93%  BMI 37.4 kg/m2  Body mass index is 37.4 kg/(m^2).     GENERAL APPEARANCE: healthy, alert and in no distress  EYES: Eyes grossly normal to inspection, and conjunctivae and sclerae normal  RESP: lungs clear to auscultation - no rales, rhonchi or wheezes  CV: regular rate and rhythm, normal S1 S2, no S3 or S4, no murmur, click or rub, no peripheral edema and peripheral pulses strong  ABDOMEN: soft, nontender, no hepatosplenomegaly, no masses and bowel sounds normal  : no abnormal vaginal or cervical lesions or discharge.   MS: no musculoskeletal defects are noted and gait is age appropriate without ataxia  SKIN: no suspicious lesions or rashes  NEURO: mentation intact and speech normal;facial droop as at baseline  PSYCH: mentation appears normal and affect normal/bright.     Results for orders placed or performed in visit on 07/21/17   Basic metabolic panel   Result Value Ref Range    Sodium 139 133 - 144 mmol/L    Potassium 4.1 3.4 - 5.3 mmol/L    Chloride 104 94 - 109 mmol/L    Carbon Dioxide 28 20 - 32 mmol/L    Anion Gap 7 3 - 14 mmol/L    Glucose 106 (H) 70 - 99 mg/dL    Urea Nitrogen 9 7 - 30 mg/dL    Creatinine 0.83 0.52 - 1.04 mg/dL    GFR Estimate 68 >60 mL/min/1.7m2    GFR Estimate If Black 82 >60 mL/min/1.7m2    Calcium 9.7 8.5 - 10.1 mg/dL          ICD-10-CM    1. Squamous cell  carcinoma C44.92 A Pap thin layer screen with HPV - recommended for age 30 -65     HPV High Risk Types DNA Cervical    unknown primary, need a PAP     PLAN:  See tele encounter 9.13.17.    Patient Instructions   We will forward the PAP results to Dr Murray Lewis.                  Zayra Marques MD  [facility]

## 2017-09-13 NOTE — NURSING NOTE
"Chief Complaint   Patient presents with     Consult       Initial /75  Pulse 86  Wt 246 lb (111.6 kg)  SpO2 93%  BMI 37.4 kg/m2 Estimated body mass index is 37.4 kg/(m^2) as calculated from the following:    Height as of 7/21/17: 5' 8\" (1.727 m).    Weight as of this encounter: 246 lb (111.6 kg).  Medication Reconciliation: complete  "

## 2017-09-13 NOTE — MR AVS SNAPSHOT
"              After Visit Summary   9/13/2017    Sandy Oreilly    MRN: [id number]           Patient Information     Date Of Birth          1945        Visit Information        Provider Department      9/13/2017 6:40 PM Zayra Marques MD [facility]        Today's Diagnoses     Squamous cell carcinoma    -  1      Care Instructions    We will forward the PAP results to Dr Murray Lewis.            Follow-ups after your visit        Your next 10 appointments already scheduled     Jan 24, 2018  8:00 AM CST   [facility]    [address] [facility] [address] [phone]           Bring a current list of meds and any records pertaining to this visit. For Physicals, please bring immunization records and any forms needing to be filled out. Please arrive 10 minutes early to complete paperwork.              Who to contact     If you have questions or need follow up information about today's clinic visit or your schedule please contact [facility] directly at [phone].  Normal or non-critical lab and imaging results will be communicated to you by Big Data Partnershiphart, letter or phone within 4 business days after the clinic has received the results. If you do not hear from us within 7 days, please contact the clinic through GPNXt or phone. If you have a critical or abnormal lab result, we will notify you by phone as soon as possible.  Submit refill requests through Saber Seven or call your pharmacy and they will forward the refill request to us. Please allow 3 business days for your refill to be completed.          Additional Information About Your Visit        MyChart Information     Saber Seven lets you send messages to your doctor, view your test results, renew your prescriptions, schedule appointments and more. To sign up, go to [email or web address] . Click on \"Log in\" on the left side " "of the screen, which will take you to the Welcome page. Then click on \"Sign up Now\" on the right side of the page.     You will be asked to enter the access code listed below, as well as some personal information. Please follow the directions to create your username and password.     Your access code is: [email or web address]  Expires: 10/19/2017  8:33 AM     Your access code will  in 90 days. If you need help or a new code, please call your [place] clinic or [phone].        Care EveryWhere ID     This is your Care EveryWhere ID. This could be used by other organizations to access your [place] medical records  [id number]        Your Vitals Were     Pulse Pulse Oximetry BMI (Body Mass Index)             86 93% 37.4 kg/m2          Blood Pressure from Last 3 Encounters:   17 140/75   17 137/79   17 148/87    Weight from Last 3 Encounters:   17 246 lb (111.6 kg)   17 244 lb (110.7 kg)   17 258 lb 12.8 oz (117.4 kg)              We Performed the Following     A Pap thin layer screen with HPV - recommended for age 30 -65     HPV High Risk Types DNA Cervical        Primary Care Provider Office Phone # Fax #    Zayra Marques [phone] [address] [facility] [address]        Equal Access to Services     [facility] : Hadii zari mullen hadasho [place], waaxda luqadaha, qaybta kaalmada familia, heena zamudio. So [facility] [phone].    ATENCIÓN: Si habla español, tiene a kevin disposición servicios gratuitos de asistencia lingüística. Llame al [phone].    We comply with applicable federal civil rights laws and [place] laws. We do not discriminate on the basis of race, color, national origin, age, disability sex, sexual orientation or gender identity.            Thank you!     Thank you for choosing [facility]  for your care. Our goal is always to provide you with excellent care. Hearing back from our " patients is one way we can continue to improve our services. Please take a few minutes to complete the written survey that you may receive in the mail after your visit with us. Thank you!             Your Updated Medication List - Protect others around you: Learn how to safely use, store and throw away your medicines at www.disposemymeds.org.          This list is accurate as of: 9/13/17  7:39 PM.  Always use your most recent med list.                   Brand Name Dispense Instructions for use Diagnosis    aspirin 81 MG tablet      Take 81 mg by mouth daily        atorvastatin 40 MG tablet    LIPITOR    90 tablet    TAKE ONE TABLET BY MOUTH ONE TIME DAILY    Hyperlipidemia with target LDL less than 130       lisinopril 20 MG tablet    PRINIVIL/ZESTRIL    90 tablet    Take 1 tablet (20 mg) by mouth daily    Benign essential hypertension       ONE-A-DAY WOMENS 50 PLUS PO      Take by mouth daily        senna-docusate 8.6-50 MG per tablet    SENOKOT-S;PERICOLACE     Take 1 tablet by mouth daily        triamcinolone 0.1 % cream    KENALOG    80 g    Apply topically 2 times daily    Eczema, unspecified type

## 2017-09-14 NOTE — TELEPHONE ENCOUNTER
This patient had a PAP completed today as part of her investigation for a metastatic squamous cell carcinoma of an unknown primary source.  Please fax the result of the PAP to Dr Murray Lewis of [facility]. His office phone number is: [phone].  Please also contact his office and ask him if he would like us to order a pelvic ultrasound for this patient.    Thank you,  Zayra Marques MD

## 2017-09-20 NOTE — TELEPHONE ENCOUNTER
I faxed the final results of the Pap to [facility] Attn: Dr. Murray Lewis @[phone].  I spoke to his nurse and Dr. Lewis said that he does not want us to order a pelvic ultrasound for this patient.  Ely Olsen,